# Patient Record
Sex: FEMALE | Race: WHITE | URBAN - METROPOLITAN AREA
[De-identification: names, ages, dates, MRNs, and addresses within clinical notes are randomized per-mention and may not be internally consistent; named-entity substitution may affect disease eponyms.]

---

## 2023-11-16 ENCOUNTER — OFFICE VISIT (OUTPATIENT)
Dept: ORTHOPEDIC SURGERY | Age: 63
End: 2023-11-16
Payer: COMMERCIAL

## 2023-11-16 VITALS — WEIGHT: 148 LBS | HEIGHT: 67 IN | BODY MASS INDEX: 23.23 KG/M2

## 2023-11-16 DIAGNOSIS — M25.511 RIGHT SHOULDER PAIN, UNSPECIFIED CHRONICITY: Primary | ICD-10-CM

## 2023-11-16 DIAGNOSIS — M75.101 TEAR OF RIGHT ROTATOR CUFF, UNSPECIFIED TEAR EXTENT, UNSPECIFIED WHETHER TRAUMATIC: ICD-10-CM

## 2023-11-16 DIAGNOSIS — M67.911 DYSFUNCTION OF RIGHT ROTATOR CUFF: ICD-10-CM

## 2023-11-16 PROCEDURE — 99203 OFFICE O/P NEW LOW 30 MIN: CPT | Performed by: ORTHOPAEDIC SURGERY

## 2023-11-16 RX ORDER — SULFASALAZINE 500 MG/1
500 TABLET, DELAYED RELEASE ORAL 2 TIMES DAILY
COMMUNITY
Start: 2023-06-29

## 2023-11-16 RX ORDER — METHOTREXATE 2.5 MG/1
20 TABLET ORAL WEEKLY
COMMUNITY
Start: 2023-10-02

## 2023-11-16 RX ORDER — HYDROCHLOROTHIAZIDE 25 MG/1
25 TABLET ORAL DAILY
COMMUNITY

## 2023-11-16 RX ORDER — FOLIC ACID 1 MG/1
1 TABLET ORAL DAILY
COMMUNITY
Start: 2023-10-02

## 2023-11-16 RX ORDER — ALENDRONATE SODIUM 70 MG/1
TABLET ORAL
COMMUNITY
Start: 2023-07-25

## 2023-11-16 NOTE — PROGRESS NOTES
[address]  History and Physical  Shoulder Pain    Date:  2023    Name:  Armando De Leon  Address:  [address] [place] [id number]    :  1960      Age:   58 y.o.    SSN:  [id number]      Medical Record Number:  [id number]    Reason for Visit:    Shoulder Pain (NP RIGHT SHOULDER)      HPI:   Armando De Leon is a 58 y.o. female who presents to our office today complaining of  right shoulder pain. The patient has been experiencing some discomfort and pain over the deltoid and biceps region of the right shoulder for a few months. It became acutely worse 2 days ago when she lifted her arm to open up a heavy door. She heard and felt a ripping sensation in the right shoulder. Immediately following this, she was unable to lift her arm up. Since then she is now able to lift her arm but it is painful. She does have pain with any overhead movements and also when moving her arm behind her back. She has noticed significant weakness with the right shoulder. She is a  and works full-time. 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 Pain Assessment  Location of Pain: Shoulder  Location Modifiers: Right  Severity of Pain: 5  Quality of Pain: Sharp  Duration of Pain: Persistent  Frequency of Pain: Constant  Aggravating Factors: Other (Comment), Straightening, Stretching, Exercise  Limiting Behavior: Yes  Relieving Factors: Rest, Other (Comment)  Result of Injury: No  Work-Related Injury: No  Are there other pain locations you wish to document?: No    Review of Systems:  A 14 point review of systems available in the scanned medical record as documented by the patient. The review is negative with the exception of those things mentioned in the History of Present Illness and Past Medical History.       Past History:  Past Medical History:   Diagnosis Date    Arthritis     High blood pressure     Hyperlipidemia

## 2023-12-01 ENCOUNTER — TELEPHONE (OUTPATIENT)
Dept: ORTHOPEDIC SURGERY | Age: 63
End: 2023-12-01

## 2023-12-01 NOTE — TELEPHONE ENCOUNTER
Other PATIENT CALLED WANTED TO KNOW THE REASON THAT HER MRI WAS DENIED. DOES NOT UNDERSTAND WHY ITS SAYING ITS NOT MEDICALLY NECESSARY. CAN THIS BE APPEALED?  IS STEP THERAPY REQUIRED? PLS CALL TO [facility] [phone]. MRI SCHEDULED FOR 12/9/23 SO SHE IS HOPING THAT THIS CAN BE RESOLVED BEFORE THEN

## 2023-12-04 ENCOUNTER — TELEPHONE (OUTPATIENT)
Dept: ORTHOPEDIC SURGERY | Age: 63
End: 2023-12-04

## 2023-12-04 NOTE — TELEPHONE ENCOUNTER
Faxing Patient Information     Facility Name: [facility]  Contact Name:   Contact Number:   Facility Fax Number: [phone]      THE PT NEEDS AN ORDER FOR PT FAXED TO [facility] [phone]

## 2023-12-07 ENCOUNTER — TELEPHONE (OUTPATIENT)
Dept: ORTHOPEDIC SURGERY | Age: 63
End: 2023-12-07

## 2023-12-07 NOTE — TELEPHONE ENCOUNTER
[facility] CALL[facility].     FAX # [phone]  KASSANDRA CHAVEZ    PLEASE FAX PT ORDER TO THEM, FOR THIS Pt.

## 2024-01-11 ENCOUNTER — OFFICE VISIT (OUTPATIENT)
Dept: ORTHOPEDIC SURGERY | Age: 64
End: 2024-01-11
Payer: COMMERCIAL

## 2024-01-11 VITALS — WEIGHT: 162 LBS | BODY MASS INDEX: 25.43 KG/M2 | HEIGHT: 67 IN

## 2024-01-11 DIAGNOSIS — M67.911 DYSFUNCTION OF RIGHT ROTATOR CUFF: Primary | ICD-10-CM

## 2024-01-11 PROCEDURE — 99213 OFFICE O/P EST LOW 20 MIN: CPT | Performed by: ORTHOPAEDIC SURGERY

## 2024-01-11 NOTE — PROGRESS NOTES
Chief Complaint    Shoulder Pain (F/U RIGHT SHOULDER)      History of Present Illness:  Susan M Luersen is a pleasant, 63 y.o., female, here today for follow up of her right shoulder. To recap, this patient has been experiencing some discomfort and pain over the deltoid and biceps region of the right shoulder for a few months.  It became acutely worse 2 days ago when she lifted her arm to open up a heavy door.  She heard and felt a ripping sensation in the right shoulder.  Immediately following this, she was unable to lift her arm up.  We wanted to obtain an MRI of the shoulder to rule out a rotator cuff tear, unfortunately the MRI was denied by insurance due to lack of conservative treatment. She did undergo a course of physical therapy and has been modifying her activities to avoid pain. Additionally she has been taking an oral anti-inflammatory. She continues to have persistent pain in the shoulder despite these modalities. She has pain at night. She reports no new injuries or setbacks.     Pain Assessment  Location of Pain: Shoulder  Location Modifiers: Right  Severity of Pain: 0  Quality of Pain: Sharp, Throbbing  Duration of Pain: A few hours  Frequency of Pain: Intermittent  Aggravating Factors: Other (Comment), Stretching, Straightening, Exercise  Limiting Behavior: Yes  Relieving Factors: Rest, Other (Comment), Exercise, Ice  Work-Related Injury: No  Are there other pain locations you wish to document?: No      Medical History:  Patient's medications, allergies, past medical, surgical, social and family histories were reviewed and updated as appropriate.    No notes on file    Review of Systems  A 14 point review of systems was completed by the patient and is available in the media section of the scanned medical record and was reviewed on 1/11/2024.  The review is negative with the exception of those things mentioned in the HPI and Past Medical History    Vital Signs:  There were no vitals filed for this

## 2024-01-17 ENCOUNTER — TELEPHONE (OUTPATIENT)
Dept: ORTHOPEDIC SURGERY | Age: 64
End: 2024-01-17

## 2024-01-17 NOTE — TELEPHONE ENCOUNTER
General Question     Subject: MRI  Patient and /or Facility Request: LUERSEN, SUSAN  Contact Number: [phone]    PT CALLED STATING SHE SAW DR SESAY LAST WEEK, SHE WAS ADVISED [facility] WOULD BE REACHING OUT TO HER TO SCHEDULE SHOULDER MRI BUT SHE HASNT HEARD ANYTHING YET.    PLEASE CALL PT BACK AT THE ABOVE NUMBER.  OK TO M WITH ZACHARY

## 2024-02-08 ENCOUNTER — OFFICE VISIT (OUTPATIENT)
Dept: ORTHOPEDIC SURGERY | Age: 64
End: 2024-02-08

## 2024-02-08 DIAGNOSIS — M67.911 DYSFUNCTION OF RIGHT ROTATOR CUFF: Primary | ICD-10-CM

## 2024-02-08 NOTE — H&P (VIEW-ONLY)
perioral lesions  Pulm: Respirations unlabored and regular.  Neuro: Alert and oriented x3    Right Shoulder Exam:  Inspection: No gross deformities, no signs of infection.     Palpation: Tenderness over the rotator cuff footprint     Active Range of Motion:  Forward Elevation 160, External Rotation 60     Passive Range of Motion:      Strength:  External Rotation 4-/5 vs 4+/5 on the left, Champagne Toast 4/5, Supraspinatus 4-/5     Special Tests:  Positive Jairo No Iain muscle deformity.     Neurovascular: Sensation to light touch is intact, no motor deficits, palpable radial pulses 2+      Radiographic:  MRI of the right shoulder dated 1/30/2024 demonstrates a massive rotator cuff tear involving the supraspinatus and infraspinatus retracted to the level of the glenoid with grade 2/3 fibrofatty infiltration.  There is a medially subluxated long head of biceps tendon with a laminar subscapularis tear.  There is a chronic SLAP lesion.    Assessment:  Susan M Luersen is a 63 y.o. female with a massive right shoulder rotator cuff tear.    Impression:  Encounter Diagnosis   Name Primary?    Dysfunction of right rotator cuff Yes       Office Procedures:  Orders Placed This Encounter   Procedures    DJO ultrasling Shoulder Sling     Patient was prescribed a DJO Ultrasling IV Shoulder Brace.   The right shoulder will require stabilization / immobilization from this orthosis.  The orthosis will assist in protecting the affected area, provide functional support and facilitate healing.  The device was ordered and fit on 02/08/24.    The patient was educated and fit by a healthcare professional with expert knowledge and specialization in brace application while under the direct supervision of the treating physician.  Verbal and written instructions for the use of and application of this item were provided.   They were instructed to contact the office immediately should the brace result in increased pain, decreased sensation,

## 2024-02-08 NOTE — PROGRESS NOTES
[facility]  History and Physical  Shoulder Pain    Date:  2024    Name:  Susan M Luersen  Address:  [address] [place], [place] [address]    :  1960      Age:   63 y.o.    SSN:  [id number]      Medical Record Number:  [id number]    Reason for Visit:    Follow-up (TR MRI SHOULDER )      HPI:   Susan M Luersen is a 63 y.o. female who presents to our office today for follow up of the right shoulder.  To recap, this patient has been experiencing some discomfort and pain over the deltoid and biceps region of the right shoulder for a few months.  It became acutely worse in November.  She heard and felt a ripping sensation in the right shoulder. Immediately following this, she was unable to lift her arm up.  She returns today for MRI review.  The MRI was initially denied by insurance despite our recommendations and she had to participate in a course of physical therapy.      No notes on file    Review of Systems:  A 14 point review of systems available in the scanned medical record as documented by the patient and reviewed on 2024.  The review is negative with the exception of those things mentioned in the History of Present Illness and Past Medical History.      Past Medical History:  Patient's medications, allergies, past medical, surgical, social and family histories were reviewed and updated as appropriate.    Allergies:  Allergies   Allergen Reactions    Food Swelling     Tongues swelling with daria.This is not a medication allergy       Physical Exam:  There were no vitals filed for this visit.  General: Susan M Luersen is a healthy and well appearing 63 y.o. female who is sitting comfortably in our office in acute distress.     Skin:  There are no skin lesions, cellulitis, or extreme edema. The patient has warm and well-perfused Bilateral upper extremities with brisk capillary refill.    Eyes: Extra-ocular muscles intact  Mouth: Oral mucosa moist. No

## 2024-02-09 ENCOUNTER — PREP FOR PROCEDURE (OUTPATIENT)
Dept: ORTHOPEDIC SURGERY | Age: 64
End: 2024-02-09

## 2024-02-09 DIAGNOSIS — M67.911 DISORDER OF RIGHT ROTATOR CUFF: ICD-10-CM

## 2024-02-09 DIAGNOSIS — M67.911 DYSFUNCTION OF RIGHT ROTATOR CUFF: Primary | ICD-10-CM

## 2024-02-15 ENCOUNTER — TELEPHONE (OUTPATIENT)
Dept: ORTHOPEDIC SURGERY | Age: 64
End: 2024-02-15

## 2024-02-15 NOTE — TELEPHONE ENCOUNTER
CPT: [id number]  AUTH: [employer] PER WEBSITE  INSURANCE: [employer]  LOCATION [facility]  AREA OF SX RT SHLD  NOTE: DOC SCANNED

## 2024-02-22 RX ORDER — SODIUM CHLORIDE 0.9 % (FLUSH) 0.9 %
5-40 SYRINGE (ML) INJECTION PRN
Status: CANCELLED | OUTPATIENT
Start: 2024-02-22

## 2024-02-22 RX ORDER — SODIUM CHLORIDE 9 MG/ML
INJECTION, SOLUTION INTRAVENOUS PRN
Status: CANCELLED | OUTPATIENT
Start: 2024-02-22

## 2024-02-22 RX ORDER — SODIUM CHLORIDE 0.9 % (FLUSH) 0.9 %
5-40 SYRINGE (ML) INJECTION EVERY 12 HOURS SCHEDULED
Status: CANCELLED | OUTPATIENT
Start: 2024-02-22

## 2024-03-04 NOTE — PROGRESS NOTES
3/4/2024 1248 PM:        [facility] PRE-SURGICAL TESTING INSTRUCTIONS                      PRIOR TO PROCEDURE DATE:    1. PLEASE FOLLOW ANY INSTRUCTIONS GIVEN TO YOU PER YOUR SURGEON.      2. Arrange for someone to drive you home and be with you for the first 24 hours after discharge for your safety after your procedure for which you received sedation. Ensure it is someone we can share information with regarding your discharge.     NOTE: At this time ONLY 2 ADULTS may accompany you. NO CHILDREN UNDER AGE OF 16.    One person ENCOURAGED to stay at hospital entire time if outpatient surgery      3. You must contact your surgeon for instructions IF:  You are taking any blood thinners, aspirin, anti-inflammatory or vitamins.   Contact your ordering physician/surgeon for medication instructions as soon as possible, especially if taking blood thinners, aspirin, heart, or diabetic medication. STOP SUPPLEMENTS/VITAMINS/NON-STEROIDAL ANTI-INFLAMMATORY MEDICATION 7 DAYS PRIOR TO PROCEDURE.  There is a change in your physical condition such as a cold, fever, rash, cuts, sores, or any other infection, especially near your surgical site.    4. Do not drink alcohol the day before or day of your procedure.  Do not use any recreational marijuana at least 24 hours or street drugs (heroin, cocaine) at minimum 5 days prior to your procedure.     5. A Pre-Surgical History and Physical MUST be completed WITHIN 30 DAYS OR LESS prior to your procedure.by your Physician or an Urgent Care        THE DAY OF YOUR PROCEDURE:  1.  Follow instructions for ARRIVAL TIME as DIRECTED BY YOUR SURGEON. [address] [address]     2. Enter the MAIN entrance from [facility] and follow the signs to the free Parking Garage or  Parking (offered free of charge 7 am-5pm).      3. Enter the Main Entrance of the hospital (do not enter from the lower level of the parking garage). Upon entrance, check in with  day of your procedure, any hair that needs to be removed near the surgical site will be 'clipped' by a healthcare worker using a special clipper designed to avoid skin irritation.    8. Dentures, glasses, or contacts will need to be removed before your procedure. Bring cases for your glasses, contacts, dentures, or hearing aids to protect them while you are in surgery.      9. If you use a CPAP, please bring it with you on the day of your procedure.    10. If you use oxygen at home, please bring your oxygen tank with you to hospital..     11. We recommend that valuable personal belongings such as cash, cell phones, e-tablets, or jewelry, be left at home during your stay. The hospital will not be responsible for valuables that are not secured in the hospital safe. However, if your insurance requires a co-pay, you may want to bring a method of payment, i.e., Check or credit card, if you wish to pay your co-pay the day of surgery.      12. If you are to stay overnight, you may bring a bag with personal items. Please have any large items you may need brought in by your family after your arrival to your hospital room.    13. If you have a Living Will or Durable Power of , please bring a copy on the day of your procedure.     How we keep you safe and work to prevent surgical site infections:   1. Health care workers should always check your ID bracelet to verify your name and birth date. You will be asked many times to state your name, date of birth, and allergies.    2. Health care workers should always clean their hands with soap or alcohol gel before providing care to you. It is okay to ask anyone if they cleaned their hands before they touch you.    3. You will be actively involved in verifying the type of procedure you are having and ensuring the correct surgical site. This will be confirmed multiple times prior to your procedure. Do NOT jeanmarie your surgery site UNLESS instructed to by your surgeon.     4. When

## 2024-03-04 NOTE — PROGRESS NOTES
3/4/24 1056 AM:  [facility] PHYSICIAN. PER [facility] AUDIT TRAIL, PROCEDURE PLACED ON [facility] SURGERY SCHEDULE  2/9/2024. PRE-OP ORDERS/CONSENT ENTERED BY SURGEON'S OFFICE/TS.

## 2024-03-04 NOTE — PROGRESS NOTES
3/4/24 1105 AM:    H&P IN CE 2/20/24, LAB IN CE 2/23/24, EKG AT  E CALLED [phone]    3/4/2024 1109 AM: PT ANSWERING PHONE TOOK MESSAGE AND WILL HAVE PT CALL [facility] 3/4/24 OR 3/5/24-PT CURRENTLY AT WORK/TS    3/4/2024 1250 PM:  PT RETURNED CALL AND TI COMPLETED/TS

## 2024-03-07 ENCOUNTER — TELEPHONE (OUTPATIENT)
Dept: ORTHOPEDIC SURGERY | Age: 64
End: 2024-03-07

## 2024-03-07 ENCOUNTER — ANESTHESIA EVENT (OUTPATIENT)
Dept: OPERATING ROOM | Age: 64
End: 2024-03-07
Payer: COMMERCIAL

## 2024-03-07 NOTE — TELEPHONE ENCOUNTER
General Question     Subject: POST OP QUESTION  Patient and /or Facility Request: Luersen, Susan M   Contact Number: [phone]     WAS ADV TO SPEAK TO KANE ABOUT POST OP APPT PT IS AT WORK ADV CAN LVM AT HOME NUMBER FOR POST OP TIME AND DATE

## 2024-03-08 ENCOUNTER — ANESTHESIA (OUTPATIENT)
Dept: OPERATING ROOM | Age: 64
End: 2024-03-08
Payer: COMMERCIAL

## 2024-03-08 ENCOUNTER — HOSPITAL ENCOUNTER (OUTPATIENT)
Age: 64
Setting detail: OUTPATIENT SURGERY
Discharge: HOME | End: 2024-03-08
Attending: ORTHOPAEDIC SURGERY | Admitting: ORTHOPAEDIC SURGERY
Payer: COMMERCIAL

## 2024-03-08 VITALS
RESPIRATION RATE: 14 BRPM | HEART RATE: 78 BPM | WEIGHT: 148.8 LBS | TEMPERATURE: 97.8 F | DIASTOLIC BLOOD PRESSURE: 76 MMHG | BODY MASS INDEX: 23.35 KG/M2 | HEIGHT: 67 IN | OXYGEN SATURATION: 93 % | SYSTOLIC BLOOD PRESSURE: 144 MMHG

## 2024-03-08 DIAGNOSIS — Z98.890 S/P RIGHT ROTATOR CUFF REPAIR: Primary | ICD-10-CM

## 2024-03-08 DIAGNOSIS — Z98.890 S/P ARTHROSCOPY OF RIGHT SHOULDER: ICD-10-CM

## 2024-03-08 LAB
ABO + RH BLD: NORMAL
BLD GP AB SCN SERPL QL: NORMAL

## 2024-03-08 PROCEDURE — 86850 RBC ANTIBODY SCREEN: CPT

## 2024-03-08 PROCEDURE — 2580000003 HC RX 258: Performed by: ANESTHESIOLOGY

## 2024-03-08 PROCEDURE — 3700000000 HC ANESTHESIA ATTENDED CARE: Performed by: ORTHOPAEDIC SURGERY

## 2024-03-08 PROCEDURE — 2500000003 HC RX 250 WO HCPCS

## 2024-03-08 PROCEDURE — 6360000002 HC RX W HCPCS

## 2024-03-08 PROCEDURE — 7100000010 HC PHASE II RECOVERY - FIRST 15 MIN: Performed by: ORTHOPAEDIC SURGERY

## 2024-03-08 PROCEDURE — 2709999900 HC NON-CHARGEABLE SUPPLY: Performed by: ORTHOPAEDIC SURGERY

## 2024-03-08 PROCEDURE — 7100000000 HC PACU RECOVERY - FIRST 15 MIN: Performed by: ORTHOPAEDIC SURGERY

## 2024-03-08 PROCEDURE — 3700000001 HC ADD 15 MINUTES (ANESTHESIA): Performed by: ORTHOPAEDIC SURGERY

## 2024-03-08 PROCEDURE — 2500000003 HC RX 250 WO HCPCS: Performed by: ORTHOPAEDIC SURGERY

## 2024-03-08 PROCEDURE — 86901 BLOOD TYPING SEROLOGIC RH(D): CPT

## 2024-03-08 PROCEDURE — 3600000004 HC SURGERY LEVEL 4 BASE: Performed by: ORTHOPAEDIC SURGERY

## 2024-03-08 PROCEDURE — 7100000011 HC PHASE II RECOVERY - ADDTL 15 MIN: Performed by: ORTHOPAEDIC SURGERY

## 2024-03-08 PROCEDURE — 2580000003 HC RX 258: Performed by: PHYSICIAN ASSISTANT

## 2024-03-08 PROCEDURE — 86900 BLOOD TYPING SEROLOGIC ABO: CPT

## 2024-03-08 PROCEDURE — C1713 ANCHOR/SCREW BN/BN,TIS/BN: HCPCS | Performed by: ORTHOPAEDIC SURGERY

## 2024-03-08 PROCEDURE — 64415 NJX AA&/STRD BRCH PLXS IMG: CPT | Performed by: ANESTHESIOLOGY

## 2024-03-08 PROCEDURE — 6360000002 HC RX W HCPCS: Performed by: ORTHOPAEDIC SURGERY

## 2024-03-08 PROCEDURE — 3600000014 HC SURGERY LEVEL 4 ADDTL 15MIN: Performed by: ORTHOPAEDIC SURGERY

## 2024-03-08 PROCEDURE — C9290 INJ, BUPIVACAINE LIPOSOME: HCPCS | Performed by: ANESTHESIOLOGY

## 2024-03-08 PROCEDURE — 7100000001 HC PACU RECOVERY - ADDTL 15 MIN: Performed by: ORTHOPAEDIC SURGERY

## 2024-03-08 PROCEDURE — 6360000002 HC RX W HCPCS: Performed by: PHYSICIAN ASSISTANT

## 2024-03-08 PROCEDURE — 6360000002 HC RX W HCPCS: Performed by: ANESTHESIOLOGY

## 2024-03-08 PROCEDURE — 2720000010 HC SURG SUPPLY STERILE: Performed by: ORTHOPAEDIC SURGERY

## 2024-03-08 PROCEDURE — C1763 CONN TISS, NON-HUMAN: HCPCS | Performed by: ORTHOPAEDIC SURGERY

## 2024-03-08 DEVICE — HEALICOIL RG SA 5.5MM W/3 UB-BL CB                                    BL BK
Type: IMPLANTABLE DEVICE | Site: SHOULDER | Status: FUNCTIONAL
Brand: HEALICOIL / ULTRABRAID

## 2024-03-08 DEVICE — ANCHOR TEND 8 FOR REGENETEN BIOINDUCTIVE IMPL SYS: Type: IMPLANTABLE DEVICE | Site: SHOULDER | Status: FUNCTIONAL

## 2024-03-08 DEVICE — BONE ANCHORS 3 WITH ARTHROSCOPIC DELIVERY SYSTEM ADVANCED
Type: IMPLANTABLE DEVICE | Site: SHOULDER | Status: FUNCTIONAL
Brand: BONE ANCHORS WITH ARTHROSCOPIC DELIVERY SYSTEM - ADVANCED

## 2024-03-08 DEVICE — ANCHOR SUTURE BIOCOMP 4.75X19.1 MM SWIVELOCK C: Type: IMPLANTABLE DEVICE | Site: SHOULDER | Status: FUNCTIONAL

## 2024-03-08 DEVICE — IMPLANTABLE DEVICE
Type: IMPLANTABLE DEVICE | Site: SHOULDER | Status: FUNCTIONAL
Brand: BIOINDUCTIVE IMPLANT WITH ARTHROSCOPIC DELIVERY SYSTEM - LARGE

## 2024-03-08 RX ORDER — FENTANYL CITRATE 50 UG/ML
INJECTION, SOLUTION INTRAMUSCULAR; INTRAVENOUS PRN
Status: DISCONTINUED | OUTPATIENT
Start: 2024-03-08 | End: 2024-03-08 | Stop reason: SDUPTHER

## 2024-03-08 RX ORDER — FENTANYL CITRATE 50 UG/ML
50 INJECTION, SOLUTION INTRAMUSCULAR; INTRAVENOUS EVERY 5 MIN PRN
Status: DISCONTINUED | OUTPATIENT
Start: 2024-03-08 | End: 2024-03-08 | Stop reason: HOSPADM

## 2024-03-08 RX ORDER — SODIUM CHLORIDE 0.9 % (FLUSH) 0.9 %
5-40 SYRINGE (ML) INJECTION PRN
Status: DISCONTINUED | OUTPATIENT
Start: 2024-03-08 | End: 2024-03-08 | Stop reason: HOSPADM

## 2024-03-08 RX ORDER — HYDROMORPHONE HYDROCHLORIDE 1 MG/ML
0.25 INJECTION, SOLUTION INTRAMUSCULAR; INTRAVENOUS; SUBCUTANEOUS EVERY 5 MIN PRN
Status: DISCONTINUED | OUTPATIENT
Start: 2024-03-08 | End: 2024-03-08 | Stop reason: HOSPADM

## 2024-03-08 RX ORDER — NALOXONE HYDROCHLORIDE 0.4 MG/ML
INJECTION, SOLUTION INTRAMUSCULAR; INTRAVENOUS; SUBCUTANEOUS PRN
Status: DISCONTINUED | OUTPATIENT
Start: 2024-03-08 | End: 2024-03-08 | Stop reason: HOSPADM

## 2024-03-08 RX ORDER — FENTANYL CITRATE 50 UG/ML
100 INJECTION, SOLUTION INTRAMUSCULAR; INTRAVENOUS ONCE
Status: DISCONTINUED | OUTPATIENT
Start: 2024-03-08 | End: 2024-03-08 | Stop reason: HOSPADM

## 2024-03-08 RX ORDER — ASPIRIN 325 MG
325 TABLET ORAL 2 TIMES DAILY
Qty: 28 TABLET | Refills: 0 | Status: SHIPPED | OUTPATIENT
Start: 2024-03-08 | End: 2024-03-22

## 2024-03-08 RX ORDER — MIDAZOLAM HYDROCHLORIDE 1 MG/ML
2 INJECTION INTRAMUSCULAR; INTRAVENOUS ONCE
Status: DISCONTINUED | OUTPATIENT
Start: 2024-03-08 | End: 2024-03-08 | Stop reason: HOSPADM

## 2024-03-08 RX ORDER — ONDANSETRON 2 MG/ML
INJECTION INTRAMUSCULAR; INTRAVENOUS PRN
Status: DISCONTINUED | OUTPATIENT
Start: 2024-03-08 | End: 2024-03-08 | Stop reason: SDUPTHER

## 2024-03-08 RX ORDER — MIDAZOLAM HYDROCHLORIDE 1 MG/ML
2 INJECTION INTRAMUSCULAR; INTRAVENOUS
Status: DISCONTINUED | OUTPATIENT
Start: 2024-03-08 | End: 2024-03-08 | Stop reason: HOSPADM

## 2024-03-08 RX ORDER — SODIUM CHLORIDE 0.9 % (FLUSH) 0.9 %
5-40 SYRINGE (ML) INJECTION EVERY 12 HOURS SCHEDULED
Status: DISCONTINUED | OUTPATIENT
Start: 2024-03-08 | End: 2024-03-08 | Stop reason: HOSPADM

## 2024-03-08 RX ORDER — GLYCOPYRROLATE 0.2 MG/ML
INJECTION INTRAMUSCULAR; INTRAVENOUS PRN
Status: DISCONTINUED | OUTPATIENT
Start: 2024-03-08 | End: 2024-03-08 | Stop reason: SDUPTHER

## 2024-03-08 RX ORDER — ESMOLOL HYDROCHLORIDE 10 MG/ML
INJECTION INTRAVENOUS PRN
Status: DISCONTINUED | OUTPATIENT
Start: 2024-03-08 | End: 2024-03-08 | Stop reason: SDUPTHER

## 2024-03-08 RX ORDER — ROCURONIUM BROMIDE 10 MG/ML
INJECTION, SOLUTION INTRAVENOUS PRN
Status: DISCONTINUED | OUTPATIENT
Start: 2024-03-08 | End: 2024-03-08 | Stop reason: SDUPTHER

## 2024-03-08 RX ORDER — LABETALOL HYDROCHLORIDE 5 MG/ML
10 INJECTION, SOLUTION INTRAVENOUS
Status: DISCONTINUED | OUTPATIENT
Start: 2024-03-08 | End: 2024-03-08 | Stop reason: HOSPADM

## 2024-03-08 RX ORDER — SENNOSIDES 8.6 MG
1 TABLET ORAL DAILY
Qty: 14 TABLET | Refills: 0 | Status: SHIPPED | OUTPATIENT
Start: 2024-03-08 | End: 2024-03-22

## 2024-03-08 RX ORDER — MIDAZOLAM HYDROCHLORIDE 1 MG/ML
INJECTION INTRAMUSCULAR; INTRAVENOUS PRN
Status: DISCONTINUED | OUTPATIENT
Start: 2024-03-08 | End: 2024-03-08 | Stop reason: SDUPTHER

## 2024-03-08 RX ORDER — OXYCODONE HYDROCHLORIDE AND ACETAMINOPHEN 5; 325 MG/1; MG/1
1 TABLET ORAL EVERY 6 HOURS PRN
Qty: 20 TABLET | Refills: 0 | Status: SHIPPED | OUTPATIENT
Start: 2024-03-08 | End: 2024-03-13

## 2024-03-08 RX ORDER — LIDOCAINE HYDROCHLORIDE 20 MG/ML
INJECTION, SOLUTION INTRAVENOUS PRN
Status: DISCONTINUED | OUTPATIENT
Start: 2024-03-08 | End: 2024-03-08 | Stop reason: SDUPTHER

## 2024-03-08 RX ORDER — PROPOFOL 10 MG/ML
INJECTION, EMULSION INTRAVENOUS PRN
Status: DISCONTINUED | OUTPATIENT
Start: 2024-03-08 | End: 2024-03-08 | Stop reason: SDUPTHER

## 2024-03-08 RX ORDER — ONDANSETRON 4 MG/1
4 TABLET, FILM COATED ORAL 3 TIMES DAILY PRN
Qty: 15 TABLET | Refills: 0 | Status: SHIPPED | OUTPATIENT
Start: 2024-03-08

## 2024-03-08 RX ORDER — SODIUM CHLORIDE 9 MG/ML
INJECTION, SOLUTION INTRAVENOUS PRN
Status: DISCONTINUED | OUTPATIENT
Start: 2024-03-08 | End: 2024-03-08 | Stop reason: HOSPADM

## 2024-03-08 RX ORDER — BUPIVACAINE HYDROCHLORIDE 5 MG/ML
30 INJECTION, SOLUTION EPIDURAL; INTRACAUDAL ONCE
Status: DISCONTINUED | OUTPATIENT
Start: 2024-03-08 | End: 2024-03-08 | Stop reason: HOSPADM

## 2024-03-08 RX ORDER — DEXAMETHASONE SODIUM PHOSPHATE 4 MG/ML
INJECTION, SOLUTION INTRA-ARTICULAR; INTRALESIONAL; INTRAMUSCULAR; INTRAVENOUS; SOFT TISSUE PRN
Status: DISCONTINUED | OUTPATIENT
Start: 2024-03-08 | End: 2024-03-08 | Stop reason: SDUPTHER

## 2024-03-08 RX ORDER — BUPIVACAINE HYDROCHLORIDE 5 MG/ML
INJECTION, SOLUTION EPIDURAL; INTRACAUDAL
Status: COMPLETED | OUTPATIENT
Start: 2024-03-08 | End: 2024-03-08

## 2024-03-08 RX ORDER — LIDOCAINE HYDROCHLORIDE 10 MG/ML
1 INJECTION, SOLUTION EPIDURAL; INFILTRATION; INTRACAUDAL; PERINEURAL
Status: DISCONTINUED | OUTPATIENT
Start: 2024-03-08 | End: 2024-03-08 | Stop reason: HOSPADM

## 2024-03-08 RX ORDER — METOCLOPRAMIDE HYDROCHLORIDE 5 MG/ML
10 INJECTION INTRAMUSCULAR; INTRAVENOUS
Status: DISCONTINUED | OUTPATIENT
Start: 2024-03-08 | End: 2024-03-08 | Stop reason: HOSPADM

## 2024-03-08 RX ORDER — ONDANSETRON 2 MG/ML
4 INJECTION INTRAMUSCULAR; INTRAVENOUS
Status: DISCONTINUED | OUTPATIENT
Start: 2024-03-08 | End: 2024-03-08 | Stop reason: HOSPADM

## 2024-03-08 RX ORDER — ASPIRIN 325 MG
325 TABLET ORAL DAILY
Qty: 14 TABLET | Refills: 0 | Status: SHIPPED | OUTPATIENT
Start: 2024-03-08 | End: 2024-03-08

## 2024-03-08 RX ORDER — SODIUM CHLORIDE, SODIUM LACTATE, POTASSIUM CHLORIDE, CALCIUM CHLORIDE 600; 310; 30; 20 MG/100ML; MG/100ML; MG/100ML; MG/100ML
INJECTION, SOLUTION INTRAVENOUS CONTINUOUS
Status: DISCONTINUED | OUTPATIENT
Start: 2024-03-08 | End: 2024-03-08 | Stop reason: HOSPADM

## 2024-03-08 RX ADMIN — PHENYLEPHRINE HYDROCHLORIDE 50 MCG: 10 INJECTION, SOLUTION INTRAMUSCULAR; INTRAVENOUS; SUBCUTANEOUS at 08:57

## 2024-03-08 RX ADMIN — SODIUM CHLORIDE 2000 MG: 900 INJECTION INTRAVENOUS at 07:42

## 2024-03-08 RX ADMIN — ESMOLOL HYDROCHLORIDE 20 MG: 10 INJECTION, SOLUTION INTRAVENOUS at 08:39

## 2024-03-08 RX ADMIN — ESMOLOL HYDROCHLORIDE 20 MG: 10 INJECTION, SOLUTION INTRAVENOUS at 10:06

## 2024-03-08 RX ADMIN — SODIUM CHLORIDE, POTASSIUM CHLORIDE, SODIUM LACTATE AND CALCIUM CHLORIDE: 600; 310; 30; 20 INJECTION, SOLUTION INTRAVENOUS at 08:47

## 2024-03-08 RX ADMIN — FENTANYL CITRATE 50 MCG: 50 INJECTION, SOLUTION INTRAMUSCULAR; INTRAVENOUS at 08:44

## 2024-03-08 RX ADMIN — PHENYLEPHRINE HYDROCHLORIDE 50 MCG: 10 INJECTION, SOLUTION INTRAMUSCULAR; INTRAVENOUS; SUBCUTANEOUS at 08:31

## 2024-03-08 RX ADMIN — ROCURONIUM BROMIDE 50 MG: 10 INJECTION, SOLUTION INTRAVENOUS at 07:38

## 2024-03-08 RX ADMIN — ONDANSETRON 4 MG: 2 INJECTION INTRAMUSCULAR; INTRAVENOUS at 07:47

## 2024-03-08 RX ADMIN — PHENYLEPHRINE HYDROCHLORIDE 50 MCG: 10 INJECTION, SOLUTION INTRAMUSCULAR; INTRAVENOUS; SUBCUTANEOUS at 08:49

## 2024-03-08 RX ADMIN — DEXAMETHASONE SODIUM PHOSPHATE 4 MG: 4 INJECTION INTRA-ARTICULAR; INTRALESIONAL; INTRAMUSCULAR; INTRAVENOUS; SOFT TISSUE at 07:47

## 2024-03-08 RX ADMIN — LIDOCAINE HYDROCHLORIDE 60 MG: 20 INJECTION, SOLUTION INTRAVENOUS at 07:38

## 2024-03-08 RX ADMIN — FENTANYL CITRATE 50 MCG: 50 INJECTION, SOLUTION INTRAMUSCULAR; INTRAVENOUS at 08:39

## 2024-03-08 RX ADMIN — SUGAMMADEX 100 MG: 100 INJECTION, SOLUTION INTRAVENOUS at 10:00

## 2024-03-08 RX ADMIN — PROPOFOL 20 MG: 10 INJECTION, EMULSION INTRAVENOUS at 09:03

## 2024-03-08 RX ADMIN — PROPOFOL 150 MG: 10 INJECTION, EMULSION INTRAVENOUS at 07:38

## 2024-03-08 RX ADMIN — BUPIVACAINE 10 ML: 13.3 INJECTION, SUSPENSION, LIPOSOMAL INFILTRATION at 07:05

## 2024-03-08 RX ADMIN — MIDAZOLAM HYDROCHLORIDE 2 MG: 2 INJECTION, SOLUTION INTRAMUSCULAR; INTRAVENOUS at 07:03

## 2024-03-08 RX ADMIN — FENTANYL CITRATE 50 MCG: 50 INJECTION, SOLUTION INTRAMUSCULAR; INTRAVENOUS at 07:03

## 2024-03-08 RX ADMIN — SUGAMMADEX 100 MG: 100 INJECTION, SOLUTION INTRAVENOUS at 10:04

## 2024-03-08 RX ADMIN — PROPOFOL 50 MG: 10 INJECTION, EMULSION INTRAVENOUS at 08:37

## 2024-03-08 RX ADMIN — PHENYLEPHRINE HYDROCHLORIDE 50 MCG: 10 INJECTION, SOLUTION INTRAMUSCULAR; INTRAVENOUS; SUBCUTANEOUS at 08:52

## 2024-03-08 RX ADMIN — PROPOFOL 50 MG: 10 INJECTION, EMULSION INTRAVENOUS at 07:41

## 2024-03-08 RX ADMIN — FENTANYL CITRATE 50 MCG: 50 INJECTION, SOLUTION INTRAMUSCULAR; INTRAVENOUS at 07:45

## 2024-03-08 RX ADMIN — SODIUM CHLORIDE, POTASSIUM CHLORIDE, SODIUM LACTATE AND CALCIUM CHLORIDE: 600; 310; 30; 20 INJECTION, SOLUTION INTRAVENOUS at 06:51

## 2024-03-08 RX ADMIN — BUPIVACAINE HYDROCHLORIDE 20 ML: 5 INJECTION, SOLUTION EPIDURAL; INTRACAUDAL; PERINEURAL at 07:05

## 2024-03-08 RX ADMIN — PHENYLEPHRINE HYDROCHLORIDE 50 MCG: 10 INJECTION, SOLUTION INTRAMUSCULAR; INTRAVENOUS; SUBCUTANEOUS at 08:05

## 2024-03-08 RX ADMIN — GLYCOPYRROLATE 0.2 MG: 0.2 INJECTION INTRAMUSCULAR; INTRAVENOUS at 08:29

## 2024-03-08 RX ADMIN — PHENYLEPHRINE HYDROCHLORIDE 100 MCG: 10 INJECTION, SOLUTION INTRAMUSCULAR; INTRAVENOUS; SUBCUTANEOUS at 07:50

## 2024-03-08 ASSESSMENT — PAIN - FUNCTIONAL ASSESSMENT: PAIN_FUNCTIONAL_ASSESSMENT: 0-10

## 2024-03-08 ASSESSMENT — PAIN SCALES - GENERAL
PAINLEVEL_OUTOF10: 0
PAINLEVEL_OUTOF10: 0

## 2024-03-08 ASSESSMENT — LIFESTYLE VARIABLES: SMOKING_STATUS: 0

## 2024-03-08 NOTE — BRIEF OP NOTE
Brief Postoperative Note      Patient: Susan M Luersen  YOB: 1960  MRN: [id number]    Date of Procedure: 3/8/2024    Pre-Op Diagnosis Codes:     * Disorder of right rotator cuff [M67.911]    Post-Op Diagnosis: Same       Procedure(s):  RIGHT SHOULDER EXAM UNDER ANESTHESIA, DIAGNOSTIC ARTHROSCOPY, ARTHROSCOPIC EXTENSIVE DEBRIDEMENT, DECOMPRESSION, LYSIS OF ADHESIONS, ROTATOR CUFF REPAIR, PATCH AUGMENTATION,  ARTHROSCOPIC BICEPS TENODESIS    Surgeon(s):  Mariann Sinha MD    Assistant:  Surgical Assistant: Adam Regan  Physician Assistant: Venu Soliman PA-C    Anesthesia: General    Estimated Blood Loss (mL): 50 mL    Complications: None    Specimens:   * No specimens in log *    Implants:  Healcoil Regenesorb 5.5 mm x2  Suture anchor BioComposite  Tendon anchor  Bioinductive Implant         Drains: None    Electronically signed by Venu Soliman PA-C on 3/8/2024 at 10:14 AM

## 2024-03-08 NOTE — ANESTHESIA POSTPROCEDURE EVALUATION
Department of Anesthesiology  Postprocedure Note    Patient: Susan M Luersen  MRN: [id number]  YOB: 1960  Date of evaluation: 3/8/2024    Procedure Summary       Date: 03/08/24 Room / Location: [address]    Anesthesia Start: 0730 Anesthesia Stop: 1021    Procedure: RIGHT SHOULDER EXAM UNDER ANESTHESIA, DIAGNOSTIC ARTHROSCOPY, ARTHROSCOPIC EXTENSIVE DEBRIDEMENT, DECOMPRESSION, LYSIS OF ADHESIONS, ROTATOR CUFF REPAIR, PATCH AUGMENTATION,  ARTHROSCOPIC BICEPS TENODESIS (Right) Diagnosis:       Disorder of right rotator cuff      (Disorder of right rotator cuff [M67.911])    Surgeons: Mariann Sinha MD Responsible Provider: Gibson Magallon MD    Anesthesia Type: general, regional ASA Status: 2            Anesthesia Type: No value filed.    Trina Phase I: Trina Score: 10    Trina Phase II:      Anesthesia Post Evaluation    Patient location during evaluation: PACU  Level of consciousness: awake and alert  Pain score: 0  Airway patency: patent  Nausea & Vomiting: no vomiting  Cardiovascular status: blood pressure returned to baseline  Respiratory status: acceptable  Hydration status: euvolemic  Multimodal analgesia pain management approach  Pain management: adequate    No notable events documented.

## 2024-03-08 NOTE — INTERVAL H&P NOTE
Update History & Physical    The patient's History and Physical of February 20, 2024 was reviewed with the patient and I examined the patient. There was no change. The surgical site was confirmed by the patient and me.     Plan: The risks, benefits, expected outcome, and alternative to the recommended procedure have been discussed with the patient. Patient understands and wants to proceed with the procedure.     Electronically signed by Venu Gregory PA-C on 3/8/2024 at 7:55 AM

## 2024-03-08 NOTE — PROGRESS NOTES
Procedure: R interscalene peripheral block  MD: Dr. Magallon  Timeout performed at 0704  Pt monitored closely on heart monitor, 2L NC, continuous pulse oximetry, EtCO2, and frequent BPs.   Pt remained alert and oriented x4. pt tolerated procedure well.

## 2024-03-08 NOTE — PROGRESS NOTES
Patient arrived to PACU post RIGHT SHOULDER EXAM UNDER ANESTHESIA, DIAGNOSTIC ARTHROSCOPY, ARTHROSCOPIC EXTENSIVE DEBRIDEMENT, DECOMPRESSION, LYSIS OF ADHESIONS, ROTATOR CUFF REPAIR, PATCH AUGMENTATION, ARTHROSCOPIC BICEPS TENODESIS - Right  with Dr. Sinha. MACARENA on arrival. CRNA gave PACU RN report at bedside stating no complications during procedure. Dressing to surgical site clean, dry and intact. Patient shows no signs of pain at this time. Will continue to monitor.    10-Apr-2018

## 2024-03-08 NOTE — PROGRESS NOTES
PACU Transfer to [facility]    Vitals:    03/08/24 1115   BP: (!) 144/76   Pulse: 78   Resp: 14   Temp: 97.8 °F (36.6 °C)   SpO2: 93%         Intake/Output Summary (Last 24 hours) at 3/8/2024 1122  Last data filed at 3/8/2024 1020  Gross per 24 hour   Intake 1450 ml   Output 50 ml   Net 1400 ml       Pain assessment:  none  Pain Level: 0    Patient has all belongings at discharge from PACU.  PACU RN called and updated patient's family.     Patient transferred via STRETCHER per CELINE to care of [facility] RN.

## 2024-03-08 NOTE — ANESTHESIA PRE PROCEDURE
sodium chloride flush 0.9 % injection 5-40 mL  5-40 mL IntraVENous 2 times per day Nagar, Sushma, PA-C       • sodium chloride flush 0.9 % injection 5-40 mL  5-40 mL IntraVENous PRN Nagar, Sushma, PA-C       • 0.9 % sodium chloride infusion   IntraVENous PRN Nagar, Sushma, PA-C       • ceFAZolin (ANCEF) 2,000 mg in sodium chloride 0.9 % 50 mL IVPB (mini-bag)  2,000 mg IntraVENous On Call to OR Nagar, Sushma, PA-C       • lidocaine PF 1 % injection 1 mL  1 mL IntraDERmal Once PRN Gibson Magallon MD       • lactated ringers IV soln infusion   IntraVENous Continuous Gibson Magallon  mL/hr at 03/08/24 0651 New Bag at 03/08/24 0651   • sodium chloride flush 0.9 % injection 5-40 mL  5-40 mL IntraVENous 2 times per day Gibson Magallon MD       • sodium chloride flush 0.9 % injection 5-40 mL  5-40 mL IntraVENous PRN Gibson Magallon MD       • BUPivacaine (PF) (MARCAINE) 0.5 % injection 150 mg  30 mL Infiltration Once Gibson Magallon MD       • midazolam (VERSED) injection 2 mg  2 mg IntraVENous Once Gibson Magallon MD       • fentaNYL (SUBLIMAZE) injection 100 mcg  100 mcg IntraVENous Once Gibson Magallon MD           Allergies:    Allergies   Allergen Reactions   • Food Swelling     Tongues swelling with daria.This is not a medication allergy       Problem List:    Patient Active Problem List   Diagnosis Code   • Patellofemoral arthritis of right knee M17.11   • Right knee pain M25.561   • Patella-femoral syndrome M22.2X9   • Patella, 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

## 2024-03-08 NOTE — DISCHARGE INSTRUCTIONS
Dr. Sinha's Discharge Instructions    - Take pain medication as directed. If taking narcotic pain medication, do not take tylenol with this as there is tylenol in percocet/norco. Do not operate machinery while taking narcotic pain medications  - Non weight bearing to effected extremity  - Maintain sling until follow up or therapy. OK to remove sling several times a day to move elbow and wrist, no shoulder motion unless directed by therapy or Dr. Sinha.  - Therapy as instructed. You should receive a call regarding therapy  - Resume regular diet  - May take down dressing in 72 hours and replace with a clean dressing  - May shower after 72 hours. Avoid any submersion of operative extremity. Avoid any hot tubs, tub baths, pools, lakes, ocean ect. Avoid contact with dishwater or dirty water.  - Call immediately if any increasing redness or drainage, any fevers.  - Senna for constipation    - Zofran for nausea  - Aspirin 325 mg twice a day for next 14 days to decrease risk of blood clots    Dr. Mariann Sinha  [facility]  Call [phone] for appointment       PERIPHERAL NERVE BLOCK INSTRUCTIONS     Please remember while having a nerve block you are at an increased risk for BALANCE ISSUES AND FALLS  You were given a nerve block today from the anesthesiologist. Most nerve blocks last anywhere from 6-36 hours.  You should start taking your pain medication before the block wears off or when you first begin feeling discomfort. It takes at least 30-60 minutes for a pain pill to take effect. Pain medications should be taken with food.  Consider setting an alarm through the night to help manage your pain level so you do not wake up with too much pain.   Pain medicines can cause more sedation and decrease your breathing so ONLY take as directed. If you have Sleep Apnea, you need to use your C-Pap machine.   What to expect after a nerve block:    Numbness, tingling- affected area feels heavy or  reviewed with patient/significant other.  The following additional patient specific information was reviewed with the patient/significant other:  []Procedure/physician specific instructions  []Medication information sheet(S) including potential side effects  []Debbie’s egress test  []Pain Ball management  []FAQ Catheter associated blood stream infections  []FAQ Surgical Site Infections  []Other-    I have read and understand the instructions given to me: ____________________________________________   (Patient/S.O. Signature)            Date/time 3/8/2024 10:30 AM                 If you smoke STOP. We care about your health!

## 2024-03-08 NOTE — PROGRESS NOTES
Ambulatory Surgery/Procedure Discharge Note    Vitals:    03/08/24 1115   BP: (!) 144/76   Pulse: 78   Resp: 14   Temp: 97.8 °F (36.6 °C)   SpO2: 93%       In: 1450 [I.V.:1400]  Out: 50     Restroom use offered before discharge.  Yes    Pain assessment:  level of pain (1-10, 10 severe),   Pain Level: 0    Patient discharged to home with family.     Patient discharged to home/self care. Patient discharged via wheel chair by transporter to waiting family/S.O.       3/8/2024 12:39 PM

## 2024-03-08 NOTE — ANESTHESIA PROCEDURE NOTES
Peripheral Block    Patient location during procedure: pre-op  Reason for block: post-op pain management and at surgeon's request  Start time: 3/8/2024 7:05 AM  End time: 3/8/2024 7:10 AM  Staffing  Performed: anesthesiologist   Anesthesiologist: Gibson Magallon MD  Performed by: Gibson Magallon MD  Authorized by: Gibson Magallon MD    Preanesthetic Checklist  Completed: patient identified, IV checked, site marked, risks and benefits discussed, surgical/procedural consents, equipment checked, pre-op evaluation, timeout performed, anesthesia consent given, oxygen available and monitors applied/VS acknowledged  Peripheral Block   Patient position: sitting  Prep: ChloraPrep  Provider prep: mask and sterile gloves  Patient monitoring: cardiac monitor, continuous pulse ox, frequent blood pressure checks and IV access  Block type: Brachial plexus  Interscalene  Laterality: right  Injection technique: single-shot  Guidance: ultrasound guided  Local infiltration: lidocaine  Infiltration strength: 1 %  Local infiltration: lidocaine  Dose: 3 mL    Needle   Needle gauge: 21 G  Needle localization: ultrasound guidance  Needle length: 10 cm  Assessment   Injection assessment: negative aspiration for heme, no paresthesia on injection and local visualized surrounding nerve on ultrasound  Paresthesia pain: none  Slow fractionated injection: yes  Hemodynamics: stable  Outcomes: uncomplicated and patient tolerated procedure well    Additional Notes  Immediately prior to procedure a \"time out\" was called to verify the correct patient, allergies, laterality, procedure and equipment. Time out performed with  RN    Local Anesthetic:20 cc 0.5 %  Bupivacaine + 10 cc Exparel  Amount: 30 ml  in 5 ml increments after negative aspiration each time.    Anterior scalene and middle scalene muscles, upper, middle and lower trunks of the brachial plexus are identified, the tip of the needle and the spread of the local anesthetic around the brachial

## 2024-03-10 NOTE — OP NOTE
deployed from lateral.  We fixed it to the tendon using multiple TRICIA staples and bone using 2-piece staples, 1 anterolateral, 1 posterolateral.  We documented with arthroscopic images.  We irrigated copiously.  We closed the arthroscopic portals total of 5, with layered closure including deep 2-0 Vicryl in interrupted inverted fashion.  Routine skin closure with 4-0 Monocryl and Dermabond-type dressing.  Sterile compressive dressings were applied.  The arm was placed in a padded soft brace.  The patient repositioned in supine position before this and promptly awakened from anesthesia, having tolerated the procedure well, taken from the operating room to the recovery room in satisfactory condition.    PLAN:  The patient will be discharged on oral analgesic with instructions to enroll in a delayed rehab program. Follow up with me in the office in 1-2 weeks as an outpatient.          LORENZO SESAY MD      D:  03/08/2024 10:11:24     T:  03/08/2024 17:39:42     IVÁN/ELIANA  Job #:  [id number]     Doc#:  [id number]

## 2024-03-12 ENCOUNTER — TELEPHONE (OUTPATIENT)
Dept: ORTHOPEDIC SURGERY | Age: 64
End: 2024-03-12

## 2024-03-12 NOTE — TELEPHONE ENCOUNTER
Other PATIENT WOULD LIKE TO SPEAK WITH NURSE OR DR FOR CLARITY IN REGARDS TO PHYSICAL THERAPY       PATIENT CAN BE REACHED [phone]

## 2024-03-14 ENCOUNTER — OFFICE VISIT (OUTPATIENT)
Dept: ORTHOPEDIC SURGERY | Age: 64
End: 2024-03-14

## 2024-03-14 VITALS — BODY MASS INDEX: 23.23 KG/M2 | HEIGHT: 67 IN | WEIGHT: 148 LBS

## 2024-03-14 DIAGNOSIS — Z98.890 S/P ROTATOR CUFF REPAIR: ICD-10-CM

## 2024-03-14 DIAGNOSIS — M25.511 RIGHT SHOULDER PAIN, UNSPECIFIED CHRONICITY: Primary | ICD-10-CM

## 2024-03-14 PROCEDURE — 99024 POSTOP FOLLOW-UP VISIT: CPT | Performed by: PHYSICIAN ASSISTANT

## 2024-03-14 NOTE — PROGRESS NOTES
tenodesis.  Surgery was 3/8/2024      Impression:  Encounter Diagnoses   Name Primary?    Right shoulder pain, unspecified chronicity Yes    S/P rotator cuff repair        Office Procedures:  No orders of the defined types were placed in this encounter.      Treatment Plan:    Overall the patient is doing well.  The pain is well-controlled.   We recommend that she wear the UltraSling brace at all times with the exception of clothing, bathing of physical therapy.  The patient was told that she is restricted from driving for at least 3 weeks postop.  We will give a print out of their physical therapy order.  She was asked to go to therapy once a week at this time.  All of her questions were fully answered today.  We would like to see her back in 2 weeks for follow-up visit.      4:09 PM  Sushma Nagar, PA-C  Orthopaedic Sports Medicine Physician Assistant    This dictation was performed with a verbal recognition program (DRAGON) and it was checked for errors.  It is possible that there are still dictated errors within this office note.  If so, please bring any errors to my attention for an addendum.  All efforts were made to ensure that this office note is accurate.

## 2024-04-04 ENCOUNTER — OFFICE VISIT (OUTPATIENT)
Dept: ORTHOPEDIC SURGERY | Age: 64
End: 2024-04-04

## 2024-04-04 VITALS — HEIGHT: 67 IN | WEIGHT: 148 LBS | BODY MASS INDEX: 23.23 KG/M2

## 2024-04-04 DIAGNOSIS — M25.511 RIGHT SHOULDER PAIN, UNSPECIFIED CHRONICITY: ICD-10-CM

## 2024-04-04 DIAGNOSIS — Z98.890 S/P ROTATOR CUFF REPAIR: Primary | ICD-10-CM

## 2024-04-04 NOTE — PROGRESS NOTES
History of Present Illness:  Susan M Luersen is a 63 y.o. female who presents for a post operative visit.  The patient underwent a right arthroscopic repair of a massive rotator cuff tear and biceps tenodesis.  Surgery was 3/8/2024.  She is going to physical therapy with Wong Kamara at [facility] for her physical therapy.  She has been compliant with her sling. The patient denies any fevers, chills, numbness, tingling, and shortness of breath.    Medical History:  Patient's medications, allergies, past medical, surgical, social and family histories were reviewed and updated as appropriate.    Review of Systems  A 14 point review of systems was completed by the patient is available in the media section of the scanned medical record and was reviewed on 4/4/2024.      Vital Signs:  There were no vitals filed for this visit.    General/Appearance: Alert and oriented and in no apparent distress.    Skin:  There are no skin lesions, cellulitis, or extreme edema. The patient has warm and well-perfused Bilateral upper extremities with brisk capillary refill.      right Shoulder Exam:    Inspection: right shoulder incision that is clean, dry and intact and well approximated. The Prineo dressing is still in place.  Mild ecchymosis and swelling are present as can be expected. There is no erythema, drainage or other signs of infection    Palpation:  No crepitus to gentle motion    Active Range of Motion: deferred    Passive Range of Motion: Patient has forward elevation of 40 degrees and external rotation of 0    Strength:  Deferred    Special Tests:  Deferred.    Neurovascular: Sensation to light touch is intact, no motor deficits, palpable radial pulses 2+    Radiology:     Plain radiographs not obtained       Assessment :  Ms. Susan M Luersen is a 63 y.o. patient who is 4 weeks status post from a right arthroscopic repair of a massive rotator cuff tear and biceps tenodesis.  Surgery was

## 2024-04-09 ENCOUNTER — TELEPHONE (OUTPATIENT)
Dept: ORTHOPEDIC SURGERY | Age: 64
End: 2024-04-09

## 2024-04-25 ENCOUNTER — OFFICE VISIT (OUTPATIENT)
Dept: ORTHOPEDIC SURGERY | Age: 64
End: 2024-04-25

## 2024-04-25 DIAGNOSIS — Z98.890 S/P ROTATOR CUFF REPAIR: Primary | ICD-10-CM

## 2024-04-25 PROCEDURE — 99024 POSTOP FOLLOW-UP VISIT: CPT | Performed by: ORTHOPAEDIC SURGERY

## 2024-04-25 NOTE — PROGRESS NOTES
History of Present Illness:  Susan M Luersen is a 63 y.o. female who presents for a post operative visit.  The patient underwent a right arthroscopic repair of a massive rotator cuff tear and biceps tenodesis.  Surgery was 3/8/2024, currently 7 weeks postop.  She is going to physical therapy with Wong Kamara at [facility] for her physical therapy.  She has been wearing her sling when at risk only. She is wondering if she can go back to work as a . The patient denies any fevers, chills, numbness, tingling, and shortness of breath. She is present with her .     Medical History:  Patient's medications, allergies, past medical, surgical, social and family histories were reviewed and updated as appropriate.    Review of Systems  A 14 point review of systems was completed by the patient is available in the media section of the scanned medical record and was reviewed on 4/25/2024.      Vital Signs:  There were no vitals filed for this visit.    General/Appearance: Alert and oriented and in no apparent distress.    Skin:  There are no skin lesions, cellulitis, or extreme edema. The patient has warm and well-perfused Bilateral upper extremities with brisk capillary refill.      Right Shoulder Exam:    Inspection: right shoulder incision that is clean, dry and intact and well approximated. The Prineo dressing is still in place.  Mild ecchymosis and swelling are present as can be expected. There is no erythema, drainage or other signs of infection    Palpation:  No crepitus to gentle motion    Active Range of Motion: Forward elevation 90, ER 20    Passive Range of Motion: Deferred    Strength:  Deferred    Special Tests:  Deferred.    Neurovascular: Sensation to light touch is intact, no motor deficits, palpable radial pulses 2+    Radiology:     Plain radiographs not obtained       Assessment :  Ms. Susan M Luersen is a 63 y.o. patient who is 7 weeks status post from a right arthroscopic repair of a massive

## 2024-05-23 ENCOUNTER — OFFICE VISIT (OUTPATIENT)
Dept: ORTHOPEDIC SURGERY | Age: 64
End: 2024-05-23

## 2024-05-23 VITALS — BODY MASS INDEX: 23.23 KG/M2 | WEIGHT: 148 LBS | HEIGHT: 67 IN

## 2024-05-23 DIAGNOSIS — Z98.890 S/P RIGHT ROTATOR CUFF REPAIR: Primary | ICD-10-CM

## 2024-05-23 PROCEDURE — 99024 POSTOP FOLLOW-UP VISIT: CPT | Performed by: ORTHOPAEDIC SURGERY

## 2024-05-23 RX ORDER — CYCLOBENZAPRINE HCL 5 MG
5 TABLET ORAL EVERY 8 HOURS PRN
COMMUNITY
Start: 2024-05-03

## 2024-05-23 RX ORDER — AMLODIPINE BESYLATE 10 MG/1
10 TABLET ORAL NIGHTLY
COMMUNITY
Start: 2024-05-03

## 2024-05-23 NOTE — PROGRESS NOTES
[facility]  History and Physical  Shoulder Pain  Date:  2024    Name:  Susan M Luersen  Address:  [address] [place], [place] [id number]    :  1960      Age:   63 y.o.    SSN:  [id number]      Medical Record Number:  [id number]    Reason for Visit:    Post-Op Check (RIGHT SHOULDER)    HPI:   Susan M Luersen is a 63 y.o. female who presents to our office today for follow up of her right shoulder.  She is approximate 10 weeks status post right arthroscopic repair of massive rotator cuff and biceps tenodesis performed on 3/8/2024.  Overall, patient reports she is doing very well at this time.  Continues to do physical therapy with Wong at [facility] since he continues to progress well in terms of range of motion and some early biceps and deltoid strengthening.  She is now out of the sling.  Denies any fevers or chills or issues with her incisions.  No other injuries or setbacks.    Pain Assessment  Location of Pain: Shoulder  Location Modifiers: Right  Severity of Pain: 0  Frequency of Pain: Intermittent  Aggravating Factors: Straightening, Exercise, Stretching  Limiting Behavior: Some  Relieving Factors: Rest, Exercise  Work-Related Injury: No  Are there other pain locations you wish to document?: No    No notes on file    Review of Systems:  A 14 point review of systems available in the scanned medical record as documented by the patient and reviewed on 2024.  The review is negative with the exception of those things mentioned in the History of Present Illness and Past Medical History.      Past Medical History:  Patient's medications, allergies, past medical, surgical, social and family histories were reviewed and updated as appropriate.    Allergies:  Allergies   Allergen Reactions    Food Swelling     Tongues swelling with daria.This is not a medication allergy       Physical Exam:  There were no vitals filed for this visit.  General: Susan M Luersen is a

## 2024-07-11 ENCOUNTER — OFFICE VISIT (OUTPATIENT)
Dept: ORTHOPEDIC SURGERY | Age: 64
End: 2024-07-11
Payer: MEDICARE

## 2024-07-11 VITALS — WEIGHT: 148 LBS | HEIGHT: 67 IN | BODY MASS INDEX: 23.23 KG/M2

## 2024-07-11 DIAGNOSIS — Z98.890 S/P RIGHT ROTATOR CUFF REPAIR: Primary | ICD-10-CM

## 2024-07-11 PROCEDURE — 3017F COLORECTAL CA SCREEN DOC REV: CPT | Performed by: ORTHOPAEDIC SURGERY

## 2024-07-11 PROCEDURE — 99213 OFFICE O/P EST LOW 20 MIN: CPT | Performed by: ORTHOPAEDIC SURGERY

## 2024-07-11 PROCEDURE — 1036F TOBACCO NON-USER: CPT | Performed by: ORTHOPAEDIC SURGERY

## 2024-07-11 PROCEDURE — G8427 DOCREV CUR MEDS BY ELIG CLIN: HCPCS | Performed by: ORTHOPAEDIC SURGERY

## 2024-07-11 PROCEDURE — G8420 CALC BMI NORM PARAMETERS: HCPCS | Performed by: ORTHOPAEDIC SURGERY

## 2024-07-11 NOTE — PROGRESS NOTES
questions.     7/11/2024  11:22 AM    Autumn Melendez ATC    [facility]    During this examination, I, Autumn Melendez ATC, functioned as a scribe for Dr. Mariann Sinha. The history taking and physical examination were performed by Dr. Sinha. All counseling during the appointment was performed between the patient and Dr. Sinha. 7/11/2024  ______________________  I, Dr. Mariann Sinha, personally performed the services described in this documentation as described by Autumn Melendez ATC in my presence, and it is both accurate and complete.    Mariann Sinha MD, PhD  7/11/2024

## 2024-08-15 ENCOUNTER — OFFICE VISIT (OUTPATIENT)
Dept: ORTHOPEDIC SURGERY | Age: 64
End: 2024-08-15
Payer: MEDICARE

## 2024-08-15 VITALS — BODY MASS INDEX: 23.23 KG/M2 | WEIGHT: 148 LBS | HEIGHT: 67 IN

## 2024-08-15 DIAGNOSIS — Z98.890 S/P ROTATOR CUFF REPAIR: ICD-10-CM

## 2024-08-15 DIAGNOSIS — Z98.890 S/P RIGHT ROTATOR CUFF REPAIR: Primary | ICD-10-CM

## 2024-08-15 PROCEDURE — 1090F PRES/ABSN URINE INCON ASSESS: CPT | Performed by: ORTHOPAEDIC SURGERY

## 2024-08-15 PROCEDURE — 1123F ACP DISCUSS/DSCN MKR DOCD: CPT | Performed by: ORTHOPAEDIC SURGERY

## 2024-08-15 PROCEDURE — G8400 PT W/DXA NO RESULTS DOC: HCPCS | Performed by: ORTHOPAEDIC SURGERY

## 2024-08-15 PROCEDURE — 3017F COLORECTAL CA SCREEN DOC REV: CPT | Performed by: ORTHOPAEDIC SURGERY

## 2024-08-15 PROCEDURE — G8427 DOCREV CUR MEDS BY ELIG CLIN: HCPCS | Performed by: ORTHOPAEDIC SURGERY

## 2024-08-15 PROCEDURE — 99213 OFFICE O/P EST LOW 20 MIN: CPT | Performed by: ORTHOPAEDIC SURGERY

## 2024-08-15 PROCEDURE — 1036F TOBACCO NON-USER: CPT | Performed by: ORTHOPAEDIC SURGERY

## 2024-08-15 PROCEDURE — G8420 CALC BMI NORM PARAMETERS: HCPCS | Performed by: ORTHOPAEDIC SURGERY

## 2024-08-15 RX ORDER — TRAMADOL HYDROCHLORIDE 50 MG/1
TABLET ORAL
COMMUNITY

## 2024-08-15 NOTE — PROGRESS NOTES
next 2 years from surgery.  The patient was told that a shoulder questionnaire would be emailed to her today.  We will see her back in our office more on an as-needed basis.  She was in agreement with this plan and all questions were answered to her satisfaction. She was encouraged to call with any questions.     Greater than 20 minutes were expended on all aspects of today's visit including documentation.    8/15/2024  2:04 PM    Sushma Nagar, PA-C  Orthopaedic Sports Medicine Physician Assistant    During this examination, I, Sushma Nagar, PA-C, functioned as a scribe for Dr. Mariann Sinha.     This dictation was performed with a verbal recognition program (DRAGON) and it was checked for errors.  It is possible that there are still dictated errors within this office note.  If so, please bring any errors to my attention for an addendum.  All efforts were made to ensure that this office note is accurate.  _______________  I, Dr. Mariann Sinha, personally performed the services described in this documentation as described by Sushma Nagar, PA-C in my presence, and it is both accurate and complete.    Mariann Sinha MD, PhD  8/15/2024

## (undated) DEVICE — TOWEL,STOP FLAG GOLD N-W: Brand: MEDLINE

## (undated) DEVICE — 3M™ STERI-DRAPE™ U-DRAPE 1067 1067 5/BX 4BX/CS/CTN&#X20;: Brand: STERI-DRAPE™

## (undated) DEVICE — CANNULA ARTHSCP L7CM DIA7MM TRNSLUC THRD FLX W/ NO SQUIRT

## (undated) DEVICE — GLOVE ORANGE PI 8   MSG9080

## (undated) DEVICE — SOLUTION IV IRRIG LACTATED RINGERS 3000ML 2B7487

## (undated) DEVICE — HEALICOIL REGENESORB 5.5 MM                                    THREADED DILATOR, DISPOSABLE: Brand: HEALICOIL

## (undated) DEVICE — BLADE SHV L13CM DIA5MM EXCALIBUR AGG COOLCUT

## (undated) DEVICE — PUDDLEVAC FLOOR SUCTION DEVICE: Brand: PUDDLEVAC

## (undated) DEVICE — 3M™ IOBAN™ 2 ANTIMICROBIAL INCISE DRAPE 6640EZ: Brand: IOBAN™ 2

## (undated) DEVICE — SHOULDER STABILIZATION KIT,                                    DISPOSABLE 12 PER BOX

## (undated) DEVICE — CANNULA ARTHSCP L3CM ID8MM DBL DAM 1 PC MOLD LO PROF FLNG

## (undated) DEVICE — SHOULDER ARTHROSCOPY: Brand: MEDLINE INDUSTRIES, INC.

## (undated) DEVICE — PROBE ABLAT XL 90DEG ASPIR BPLR RF 1 PC ELECTRD ERGO HNDL

## (undated) DEVICE — GOWN,SIRUS,POLYRNF,BRTHSLV,XL,30/CS: Brand: MEDLINE

## (undated) DEVICE — TUBING PMP L6FT CONT WAVE EXTN

## (undated) DEVICE — GOWN,REINFRCE,POLY,ECLIPSE,SLV,3XLG: Brand: MEDLINE

## (undated) DEVICE — E-Z CLEAN, NON-STICK, PTFE COATED, ELECTROSURGICAL BLADE ELECTRODE, 2.5 INCH (6.35 CM): Brand: EZ CLEAN

## (undated) DEVICE — BUR SHAVER 5 MMX13 CM 8 FLUT OVL FOR AGGRESSIVE BNE COOLCUT

## (undated) DEVICE — SUTURE MCRYL + SZ 4-0 L27IN ABSRB UD L19MM PS-2 3/8 CIR MCP426H

## (undated) DEVICE — SPONGE GZ W4XL8IN COT WVN 12 PLY

## (undated) DEVICE — TUBING PMP L16FT MAIN DISP FOR AR-6400 AR-6475

## (undated) DEVICE — SUTURE VCRL + SZ 3-0 L27IN ABSRB UD CT-2 L26MM 1/2 CIR TAPR VCP232H

## (undated) DEVICE — ADHESIVE SKIN CLOSURE WND 8.661X1.5 IN 22 CM LIQUIBAND SECUR

## (undated) DEVICE — AWL TAPERED 3.8MM DISPOSABLE: Brand: HEALICOIL

## (undated) DEVICE — NEEDLE SCORPION HD MEGA LOADER

## (undated) DEVICE — TUBING FLD MGMT Y DBL SPIK DUALWAVE

## (undated) DEVICE — UNDERGLOVE SURG SZ 8 BLU LTX FREE SYN POLYISOPRENE POLYMER

## (undated) DEVICE — MAT FLR W32XL58IN

## (undated) DEVICE — SUTURE ABSORBABLE MONOFILAMENT 1-0 CT1 27 IN VIO PDS + PDP341H